# Patient Record
Sex: FEMALE | NOT HISPANIC OR LATINO
[De-identification: names, ages, dates, MRNs, and addresses within clinical notes are randomized per-mention and may not be internally consistent; named-entity substitution may affect disease eponyms.]

---
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
## 2021-10-11 ENCOUNTER — WALK IN (OUTPATIENT)
Dept: URGENT CARE | Age: 21
End: 2021-10-11

## 2021-10-11 ENCOUNTER — LAB SERVICES (OUTPATIENT)
Dept: URGENT CARE | Age: 21
End: 2021-10-11

## 2021-10-11 ENCOUNTER — IMAGING SERVICES (OUTPATIENT)
Dept: GENERAL RADIOLOGY | Age: 21
End: 2021-10-11
Attending: FAMILY MEDICINE

## 2021-10-11 VITALS
TEMPERATURE: 98 F | DIASTOLIC BLOOD PRESSURE: 76 MMHG | RESPIRATION RATE: 16 BRPM | OXYGEN SATURATION: 97 % | HEART RATE: 64 BPM | SYSTOLIC BLOOD PRESSURE: 120 MMHG

## 2021-10-11 DIAGNOSIS — S99.921A INJURY OF RIGHT FOOT, INITIAL ENCOUNTER: Primary | ICD-10-CM

## 2021-10-11 DIAGNOSIS — S99.921A INJURY OF RIGHT FOOT, INITIAL ENCOUNTER: ICD-10-CM

## 2021-10-11 PROCEDURE — 99204 OFFICE O/P NEW MOD 45 MIN: CPT | Performed by: FAMILY MEDICINE

## 2021-10-11 PROCEDURE — 29515 APPLICATION SHORT LEG SPLINT: CPT

## 2021-10-11 PROCEDURE — 73630 X-RAY EXAM OF FOOT: CPT | Performed by: RADIOLOGY

## 2021-10-11 PROCEDURE — A6449 LT COMPRES BAND >=3" <5"/YD: HCPCS

## 2021-10-11 ASSESSMENT — PAIN SCALES - GENERAL: PAINLEVEL: 0

## 2021-10-12 ENCOUNTER — TELEPHONE (OUTPATIENT)
Dept: SPORTS MEDICINE | Age: 21
End: 2021-10-12

## 2021-10-14 ENCOUNTER — OFFICE VISIT (OUTPATIENT)
Dept: ORTHOPEDICS | Age: 21
End: 2021-10-14

## 2021-10-14 VITALS — BODY MASS INDEX: 22.5 KG/M2 | WEIGHT: 140 LBS | HEIGHT: 66 IN

## 2021-10-14 DIAGNOSIS — M79.671 PAIN IN RIGHT FOOT: Primary | ICD-10-CM

## 2021-10-14 PROCEDURE — L3260 AMBULATORY SURGICAL BOOT EAC: HCPCS | Performed by: ORTHOPAEDIC SURGERY

## 2021-10-14 PROCEDURE — 99204 OFFICE O/P NEW MOD 45 MIN: CPT | Performed by: ORTHOPAEDIC SURGERY

## 2021-10-31 ENCOUNTER — WALK IN (OUTPATIENT)
Dept: URGENT CARE | Age: 21
End: 2021-10-31

## 2021-10-31 VITALS
OXYGEN SATURATION: 98 % | RESPIRATION RATE: 20 BRPM | SYSTOLIC BLOOD PRESSURE: 110 MMHG | DIASTOLIC BLOOD PRESSURE: 70 MMHG | HEART RATE: 72 BPM | TEMPERATURE: 98 F

## 2021-10-31 DIAGNOSIS — R09.81 SINUS CONGESTION: ICD-10-CM

## 2021-10-31 DIAGNOSIS — J02.0 STREP THROAT: ICD-10-CM

## 2021-10-31 DIAGNOSIS — J02.9 SORE THROAT: Primary | ICD-10-CM

## 2021-10-31 LAB
INTERNAL PROCEDURAL CONTROLS ACCEPTABLE: YES
S PYO AG THROAT QL IA.RAPID: POSITIVE
SARS-COV+SARS-COV-2 AG RESP QL IA.RAPID: NOT DETECTED

## 2021-10-31 PROCEDURE — 99214 OFFICE O/P EST MOD 30 MIN: CPT | Performed by: INTERNAL MEDICINE

## 2021-10-31 PROCEDURE — 87426 SARSCOV CORONAVIRUS AG IA: CPT | Performed by: INTERNAL MEDICINE

## 2021-10-31 PROCEDURE — 87880 STREP A ASSAY W/OPTIC: CPT | Performed by: INTERNAL MEDICINE

## 2021-10-31 RX ORDER — AMOXICILLIN 875 MG/1
875 TABLET, COATED ORAL 2 TIMES DAILY
Qty: 20 TABLET | Refills: 0 | Status: SHIPPED | OUTPATIENT
Start: 2021-10-31 | End: 2021-11-10

## 2021-10-31 ASSESSMENT — PAIN SCALES - GENERAL: PAINLEVEL: 0

## 2022-01-06 ENCOUNTER — WALK IN (OUTPATIENT)
Dept: URGENT CARE | Age: 22
End: 2022-01-06

## 2022-01-06 VITALS
RESPIRATION RATE: 16 BRPM | DIASTOLIC BLOOD PRESSURE: 80 MMHG | HEART RATE: 64 BPM | SYSTOLIC BLOOD PRESSURE: 120 MMHG | OXYGEN SATURATION: 100 % | TEMPERATURE: 98.9 F

## 2022-01-06 DIAGNOSIS — R09.81 NASAL CONGESTION: ICD-10-CM

## 2022-01-06 DIAGNOSIS — U07.1 COVID-19 VIRUS INFECTION: Primary | ICD-10-CM

## 2022-01-06 LAB — SARS-COV+SARS-COV-2 AG RESP QL IA.RAPID: DETECTED

## 2022-01-06 PROCEDURE — 99214 OFFICE O/P EST MOD 30 MIN: CPT | Performed by: FAMILY MEDICINE

## 2022-01-06 PROCEDURE — 87426 SARSCOV CORONAVIRUS AG IA: CPT | Performed by: FAMILY MEDICINE

## 2022-03-16 ENCOUNTER — TELEPHONE (OUTPATIENT)
Dept: OBGYN | Age: 22
End: 2022-03-16

## 2022-03-17 ENCOUNTER — OFFICE VISIT (OUTPATIENT)
Dept: OBGYN | Age: 22
End: 2022-03-17

## 2022-03-17 VITALS
TEMPERATURE: 97.3 F | RESPIRATION RATE: 16 BRPM | BODY MASS INDEX: 20.57 KG/M2 | HEIGHT: 66 IN | WEIGHT: 128 LBS | SYSTOLIC BLOOD PRESSURE: 110 MMHG | DIASTOLIC BLOOD PRESSURE: 70 MMHG

## 2022-03-17 DIAGNOSIS — Z11.3 SCREEN FOR STD (SEXUALLY TRANSMITTED DISEASE): ICD-10-CM

## 2022-03-17 DIAGNOSIS — N94.10 DYSPAREUNIA, FEMALE: Primary | ICD-10-CM

## 2022-03-17 DIAGNOSIS — Z01.818 PREPROCEDURAL EXAMINATION: ICD-10-CM

## 2022-03-17 DIAGNOSIS — Z30.431 INTRAUTERINE DEVICE SURVEILLANCE: ICD-10-CM

## 2022-03-17 LAB
B-HCG UR QL: NEGATIVE
INTERNAL PROCEDURAL CONTROLS ACCEPTABLE: YES

## 2022-03-17 PROCEDURE — 87491 CHLMYD TRACH DNA AMP PROBE: CPT | Performed by: PHYSICIAN ASSISTANT

## 2022-03-17 PROCEDURE — 99213 OFFICE O/P EST LOW 20 MIN: CPT | Performed by: PHYSICIAN ASSISTANT

## 2022-03-17 PROCEDURE — 87591 N.GONORRHOEAE DNA AMP PROB: CPT | Performed by: PHYSICIAN ASSISTANT

## 2022-03-17 PROCEDURE — 87661 TRICHOMONAS VAGINALIS AMPLIF: CPT | Performed by: PHYSICIAN ASSISTANT

## 2022-03-17 PROCEDURE — 81025 URINE PREGNANCY TEST: CPT | Performed by: PHYSICIAN ASSISTANT

## 2022-03-18 LAB
C TRACH DNA GENITAL QL NAA+PROBE: NEGATIVE
N GONORRHOEA DNA GENITAL QL NAA+PROBE: NEGATIVE
T VAGINALIS DNA GENITAL QL NAA+PROBE: NEGATIVE

## 2022-04-05 ENCOUNTER — IMAGING SERVICES (OUTPATIENT)
Dept: ULTRASOUND IMAGING | Age: 22
End: 2022-04-05
Attending: PHYSICIAN ASSISTANT

## 2022-04-05 DIAGNOSIS — N94.10 DYSPAREUNIA, FEMALE: ICD-10-CM

## 2022-04-05 PROCEDURE — 76856 US EXAM PELVIC COMPLETE: CPT | Performed by: RADIOLOGY

## 2022-04-05 PROCEDURE — 76830 TRANSVAGINAL US NON-OB: CPT | Performed by: RADIOLOGY

## 2022-06-13 ENCOUNTER — WALK IN (OUTPATIENT)
Dept: URGENT CARE | Age: 22
End: 2022-06-13

## 2022-06-13 VITALS
HEART RATE: 69 BPM | TEMPERATURE: 98.5 F | SYSTOLIC BLOOD PRESSURE: 118 MMHG | OXYGEN SATURATION: 98 % | DIASTOLIC BLOOD PRESSURE: 74 MMHG | RESPIRATION RATE: 20 BRPM

## 2022-06-13 DIAGNOSIS — I88.9 LYMPHADENITIS: Primary | ICD-10-CM

## 2022-06-13 DIAGNOSIS — Z76.89 ENCOUNTER TO ESTABLISH CARE: ICD-10-CM

## 2022-06-13 DIAGNOSIS — J02.9 SORE THROAT: ICD-10-CM

## 2022-06-13 LAB
INTERNAL PROCEDURAL CONTROLS ACCEPTABLE: YES
S PYO AG THROAT QL IA.RAPID: NEGATIVE
TEST LOT EXPIRATION DATE: NORMAL
TEST LOT NUMBER: NORMAL

## 2022-06-13 PROCEDURE — 87880 STREP A ASSAY W/OPTIC: CPT | Performed by: FAMILY MEDICINE

## 2022-06-13 PROCEDURE — 99213 OFFICE O/P EST LOW 20 MIN: CPT | Performed by: FAMILY MEDICINE

## 2022-06-13 RX ORDER — PREDNISONE 20 MG/1
40 TABLET ORAL DAILY
Qty: 10 TABLET | Refills: 0 | Status: SHIPPED | OUTPATIENT
Start: 2022-06-13 | End: 2022-06-18

## 2022-06-13 RX ORDER — AZITHROMYCIN 250 MG/1
TABLET, FILM COATED ORAL
Qty: 6 TABLET | Refills: 0 | Status: SHIPPED | OUTPATIENT
Start: 2022-06-13 | End: 2022-06-18

## 2022-07-06 ENCOUNTER — WALK IN (OUTPATIENT)
Dept: URGENT CARE | Age: 22
End: 2022-07-06

## 2022-07-06 VITALS
HEART RATE: 66 BPM | DIASTOLIC BLOOD PRESSURE: 70 MMHG | RESPIRATION RATE: 18 BRPM | TEMPERATURE: 98.3 F | SYSTOLIC BLOOD PRESSURE: 110 MMHG | OXYGEN SATURATION: 100 %

## 2022-07-06 DIAGNOSIS — R21 RASH: Primary | ICD-10-CM

## 2022-07-06 PROCEDURE — 99214 OFFICE O/P EST MOD 30 MIN: CPT | Performed by: FAMILY MEDICINE

## 2022-07-06 RX ORDER — PREDNISONE 50 MG/1
50 TABLET ORAL DAILY
Qty: 5 TABLET | Refills: 0 | Status: SHIPPED | OUTPATIENT
Start: 2022-07-06 | End: 2022-07-11

## 2022-07-06 ASSESSMENT — PAIN SCALES - GENERAL: PAINLEVEL: 0

## 2022-08-16 ENCOUNTER — WALK IN (OUTPATIENT)
Dept: URGENT CARE | Age: 22
End: 2022-08-16

## 2022-08-16 VITALS
SYSTOLIC BLOOD PRESSURE: 108 MMHG | TEMPERATURE: 99.2 F | OXYGEN SATURATION: 99 % | RESPIRATION RATE: 16 BRPM | DIASTOLIC BLOOD PRESSURE: 60 MMHG | HEART RATE: 80 BPM

## 2022-08-16 DIAGNOSIS — U07.1 COVID-19 VIRUS INFECTION: Primary | ICD-10-CM

## 2022-08-16 DIAGNOSIS — R09.81 HEAD CONGESTION: ICD-10-CM

## 2022-08-16 LAB
INTERNAL PROCEDURAL CONTROLS ACCEPTABLE: YES
SARS-COV+SARS-COV-2 AG RESP QL IA.RAPID: DETECTED
TEST LOT EXPIRATION DATE: ABNORMAL
TEST LOT NUMBER: ABNORMAL

## 2022-08-16 PROCEDURE — 99214 OFFICE O/P EST MOD 30 MIN: CPT | Performed by: FAMILY MEDICINE

## 2022-08-16 PROCEDURE — 87426 SARSCOV CORONAVIRUS AG IA: CPT | Performed by: FAMILY MEDICINE

## 2022-08-16 ASSESSMENT — PAIN SCALES - GENERAL: PAINLEVEL: 0

## 2023-01-04 ENCOUNTER — WALK IN (OUTPATIENT)
Dept: URGENT CARE | Age: 23
End: 2023-01-04

## 2023-01-04 VITALS
SYSTOLIC BLOOD PRESSURE: 108 MMHG | TEMPERATURE: 97.1 F | OXYGEN SATURATION: 100 % | HEART RATE: 75 BPM | RESPIRATION RATE: 20 BRPM | DIASTOLIC BLOOD PRESSURE: 60 MMHG

## 2023-01-04 DIAGNOSIS — R30.0 DYSURIA: Primary | ICD-10-CM

## 2023-01-04 LAB
APPEARANCE, POC: ABNORMAL
BILIRUBIN, POC: NEGATIVE
COLOR, POC: YELLOW
GLUCOSE UR-MCNC: NEGATIVE MG/DL
KETONES, POC: NEGATIVE MG/DL
NITRITE, POC: POSITIVE
OCCULT BLOOD, POC: ABNORMAL
PH UR: 6.5 [PH] (ref 5–7)
PROT UR-MCNC: ABNORMAL MG/DL
SP GR UR: 1.02 (ref 1–1.03)
UROBILINOGEN UR-MCNC: 0.2 MG/DL (ref 0–1)
WBC (LEUKOCYTE) ESTERASE, POC: ABNORMAL

## 2023-01-04 PROCEDURE — 81002 URINALYSIS NONAUTO W/O SCOPE: CPT | Performed by: FAMILY MEDICINE

## 2023-01-04 PROCEDURE — 87088 URINE BACTERIA CULTURE: CPT | Performed by: INTERNAL MEDICINE

## 2023-01-04 PROCEDURE — 99214 OFFICE O/P EST MOD 30 MIN: CPT | Performed by: FAMILY MEDICINE

## 2023-01-04 PROCEDURE — 87086 URINE CULTURE/COLONY COUNT: CPT | Performed by: INTERNAL MEDICINE

## 2023-01-04 PROCEDURE — 87186 SC STD MICRODIL/AGAR DIL: CPT | Performed by: INTERNAL MEDICINE

## 2023-01-04 RX ORDER — NITROFURANTOIN 25; 75 MG/1; MG/1
100 CAPSULE ORAL 2 TIMES DAILY
Qty: 10 CAPSULE | Refills: 0 | Status: SHIPPED | OUTPATIENT
Start: 2023-01-04 | End: 2023-01-09

## 2023-01-06 LAB — BACTERIA UR CULT: ABNORMAL

## 2023-01-07 ENCOUNTER — TELEPHONE (OUTPATIENT)
Dept: URGENT CARE | Age: 23
End: 2023-01-07

## 2023-03-31 ENCOUNTER — WALK IN (OUTPATIENT)
Dept: URGENT CARE | Age: 23
End: 2023-03-31

## 2023-03-31 VITALS
OXYGEN SATURATION: 100 % | HEART RATE: 64 BPM | SYSTOLIC BLOOD PRESSURE: 114 MMHG | TEMPERATURE: 98.7 F | DIASTOLIC BLOOD PRESSURE: 65 MMHG | RESPIRATION RATE: 18 BRPM

## 2023-03-31 DIAGNOSIS — J02.9 SORE THROAT: ICD-10-CM

## 2023-03-31 DIAGNOSIS — J02.0 STREP PHARYNGITIS: Primary | ICD-10-CM

## 2023-03-31 LAB
INTERNAL PROCEDURAL CONTROLS ACCEPTABLE: YES
S PYO AG THROAT QL IA.RAPID: POSITIVE
TEST LOT EXPIRATION DATE: ABNORMAL
TEST LOT NUMBER: ABNORMAL

## 2023-03-31 PROCEDURE — 99214 OFFICE O/P EST MOD 30 MIN: CPT | Performed by: FAMILY MEDICINE

## 2023-03-31 PROCEDURE — 87880 STREP A ASSAY W/OPTIC: CPT | Performed by: FAMILY MEDICINE

## 2023-03-31 RX ORDER — AMOXICILLIN 875 MG/1
875 TABLET, COATED ORAL 2 TIMES DAILY
Qty: 14 TABLET | Refills: 0 | Status: SHIPPED | OUTPATIENT
Start: 2023-03-31 | End: 2023-04-07

## 2023-03-31 ASSESSMENT — ENCOUNTER SYMPTOMS
WHEEZING: 0
DIARRHEA: 0
SINUS PAIN: 0
SWOLLEN GLANDS: 1
TROUBLE SWALLOWING: 1
RHINORRHEA: 0
VOMITING: 0
NAUSEA: 0
SORE THROAT: 1
SHORTNESS OF BREATH: 0
FATIGUE: 0
CONSTIPATION: 0
CHILLS: 1
HEADACHES: 0
COUGH: 0
FEVER: 0
SINUS PRESSURE: 0
ABDOMINAL PAIN: 0

## 2025-01-03 ENCOUNTER — OFFICE VISIT (OUTPATIENT)
Age: 25
End: 2025-01-03
Attending: PEDIATRICS
Payer: COMMERCIAL

## 2025-01-03 ENCOUNTER — NURSE ONLY (OUTPATIENT)
Age: 25
End: 2025-01-03
Attending: PEDIATRICS
Payer: COMMERCIAL

## 2025-01-03 DIAGNOSIS — Z80.0 FAMILY HISTORY OF COLON CANCER: ICD-10-CM

## 2025-01-03 DIAGNOSIS — Z84.81 FAMILY HISTORY OF GENETIC DISEASE CARRIER: Primary | ICD-10-CM

## 2025-01-03 DIAGNOSIS — Z80.9 FAMILY HISTORY OF CANCER: ICD-10-CM

## 2025-01-03 NOTE — PROGRESS NOTES
Patient Name: Patsy Mcbride  YOB: 2000  Date of Visit: 1/3/2025    Reason for visit: Ms. Mcbride was seen for the purposes of genetic counseling due to a family history of cancer with a MSH2 c.2090G>A ([id number]) pathogenic variant causative of Hunt syndrome previously identified in her father. She was accompanied to the visit by her brother and mother    Referring Provider: Self/family    Medical History: Ms. Mcbride is a pleasant and generally healthy 24 year old female presenting with no cancer history. She has had a colonoscopy last ~2 years ago which was normal.     Ms. Mcbride achieved menarche at approximately 14 years of age, is pre-menopausal, and is . She retains her uterus, ovaries, and fallopian tubes. Ms. Mcbride has a ~4-year history of oral contraceptive use and denies any fertility or hormone replacement use.      Relevant Family History: Ms. Mcbride has 1 full brother (20y) and 1 maternal half sister (35y). The 35-year old maternal half-sister was recently dx in December with thyroid cancer, papillary type, she will be getting surgery.    Ms. Mcbride' father (55y) was dx with colon cancer at ~31-32y s/p surgery, no MMR-IHC/MSI testing was available for review if done. He has be getting c-scopes every year, there have been some benign polyps removed. He was dx with a sebaceous carcinoma of the right hip area at 50y s/p resection (no MMR IHC testing was done). He also has a h/o x2 SCC on his right ear and left nose. He sees dermatology every 6 months. 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     Paternal relatives: Cancer Site, Age at Diagnosis, Other Relevant History   Paternal aunt: BCC Unknown   Paternal uncle: Melanoma 49 Colon polyps      PGMA:  at 68y dt sepsis following renal ex-lap/bx, she  was an only child   Uterine ca  Colon/Small Intestine;  Skin (Sebaceous tumor, SCC, BCC)  Kidney tumor/cancer  Unknown ages of diagnoses for skin cancer;     Paternal great-grandfather: Prostate 80's  at 98   Paternal great-grandmother: Metastatic, unknown primary 60's  at 65   Paternal grandmother's maternal aunt #1 Cervical/uterine;   Bladder;  Pancreatic Unknown  in her 80's   Paternal grandmother's maternal aunt #2 Melanoma;  Large Intestine;  Uterine 70's;  Unknown;  Unknown   Paternal grandmother's maternal uncle #1 Colon Unknown  in his 50's   Paternal grandmother's maternal uncle #2 Colon Unknown       PGFA's brother: Leukemia 10, living.      Ms. Vera mother is a pleasant 55 year old female presenting with CLL dx ~2022. Ms. Vera mother was adopted and has limited information about other biological relatives. A known maternal uncle also reportedly has CLL.      The rest of the family history is negative for other significant genetic conditions, cancers, or birth defects of any kind. See scanned pedigree for full family history reported during the session.    Ms. Veras maternal and paternal ethnicity is /mixed-.     Summary: Because Ms. Mcbride' father was found to have a pathogenic MSH2 variant, Ms. Mcbride has an estimated 50% chance to have inherited the same MSH2 variant as her father, genetic testing is indicated.     The following information was shared with Ms. Mcbride. A printed informational handout was given.     Information for individuals with a single MSH2 pathogenic variant  Individuals with a single MSH2 pathogenic variant have a genetic condition called Hunt syndrome (LS) (aka: hereditary non-polyposis colorectal cancer/HNPCC). Individuals with Hunt syndrome have an increased risk of developing certain cancers in their life including colorectal cancer as well as cancers of the endometrium, ovary, stomach, small  intestine, urinary tract, pancreas and brain. Hunt syndrome is caused by pathogenic variants in one of the mismatch repair genes--MLH1, MSH2, MSH6, PMS2--as well as deletions in the 3' end of the EPCAM gene. It is not possible to predict when or whether a person with a MSH2 pathogenic variant will develop cancer or what type of cancer they may develop, which is why continued and close follow-up with medical providers is important.     Cancer risks associated with MSH2-related Hunt Syndrome, general population risks in parentheses (NCCN, V.3.2024):    Colorectal, 33-52%  (4.1%)  Endometrial, 21-57%  (3.1%)  Ovarian, 8-38%  (1.1%)  Renal pelvis and/or ureter, 2.2-28%  (-)  Bladder, 4.4-12.8%  (2.3%)  Gastric, 0.2-9.0%  (0.8%)  Small bowel, 1.1-10%  (0.3%)  Pancreas, 0.5-1.6%  (1.7%)  Biliary tract, 0.02-1.7%  (0.2%)  Prostate, 3.9-23.8%  (12.6%)  Brain, 2.5-7.7%  (0.5%)  Skin (benign and malignant findings), increased  (-)    Management  Surveillance guidelines for individuals with MSH2-related Hunt syndrome include (NCCN, V.3.2024):    Colon cancer  - High-quality colonoscopy every 1-2 years starting at 20-25 years of age, or 2-5 years prior to the earliest colon cancer if diagnosed before age 25.  - Consider the use of daily aspirin (600 mg/day for at least 2 years was studied, the optimal dose is still under study) as data has shown it decreases the risk of colon cancer in Hunt syndrome. The decision to use aspirin for risk reduction and dosage should be made on an individual basis, including discussion of individual risks, benefits, adverse effects, and childbearing plans.    Gynecological cancers (if applicable)  - Given the higher risks of early endometrial (uterine) cancer and ovarian cancer in MSH2, hysterectomy with bilateral salpingo-oophorectomy (BSO)(surgical removal of the uterus, ovaries, and fallopian tubes) may be considered starting at age 40 y.   - As premature menopause due to oophorectomy can  cause detriments to bone health, cardiovascular health, and generalized quality of life, estrogen replacement therapy should be considered.  -  For patients requiring a colorectal surgery such as for colorectal cancer resection, coordination with risk-reducing gynecological surgery should be considered.  - Education regarding prompt evaluation for dysfunctional uterine bleeding or other abdominal symptoms associated with endometrial and/or ovarian cancer.  - Endometrial cancer screening does not have proven benefit in patients with Hunt syndrome. However, endometrial biopsy is both highly sensitive and highly specific as a diagnostic procedure. Screening via endometrial biopsy every 1-2 y starting at age 30-35 y can be considered.  - Transvaginal ultrasound to screen for endometrial cancer in postmenopausal patients has not been shown to be sufficiently sensitive or specific as to support a positive recommendation, but may be considered at the clinician’s discretion. Transvaginal ultrasound is not recommended as a screening tool in  premenopausal patients due to the wide range of endometrial stripe thickness throughout the normal menstrual cycle.  - Data do not support routine ovarian cancer screening for Hunt syndrome. CA-125 and pelvic ultrasound are recommended for preoperative planning.  - Salpingectomy (surgical removal of the fallopian tubes) has been shown to reduce the risk of ovarian cancer in the general population and is an option for premenopausal patients with hereditary cancer risk who are not yet ready for oophorectomy.  - Consider using risk-reduction agents for endometrial and ovarian cancers, including oral contraceptive pills and progestin intrauterine systems.     Other extra-colonic cancers   - Upper GI surveillance with EGD starting at age 30-40y and repeat every 2-4 years, preferably performed in conjunction with colonoscopy. Age of initiation prior to 30 years and/or surveillance interval  less than 2 years may be considered based on family history of of upper GI cancers or high-risk endoscopic findings.  - Consider annual urinalysis beginning at 30-35 years of age to screen for urothelial cancer, particularly for individuals with a family history of urothelial cancer.   - Note: Individuals with MSH2 pathogenic variants (especially males) appear to be at higher risk of urothelial cancer.  - Consider dermatological skin exam every 1-2 years with a health care provider skilled in identifying LS-associated skin manifestations, age to begin surveillance is uncertain and can be individualized.  - Education on the signs and symptoms of neurological cancer and the important of prompt reporting of abnormal symptoms to their physicians.  - Encourage participation in routine prostate cancer screening for men beginning at age 40y and breast cancer screening for women.   - There are limited data on pancreatic cancer risk among MSH2 pathogenic variant carriers. Screening is typically recommended only for persons with a family history of 1 or more first-or second-degree relative(s) with exocrine pancreatic cancer on the same side of the family has the identified MSH2 variant. For individuals considering screening for pancreatic cancer, recommended screening includes annual endoscopic ultrasound and/or MRI/MRCP of the pancreas starting at age 50 or 10 years younger than the earliest age of pancreatic cancer diagnosis in the family.     The full version of the NCCN Guidelines is available at the following website:  www.NCCN.org.  All medical management decisions should be discussed with a physician.      Inheritance and reproductive considerations   Hunt syndrome caused by a pathogenic variant in a single copy of the MSH2 gene is inherited in an autosomal dominant pattern, meaning each first-degree relative, such as sibling or child, has a 50% chance of having inherited this pathogenic variant, and genetic testing  is recommended for adult relatives.     Individuals with MSH2-Hunt syndrome are also carriers of an autosomal recessive genetic condition called Constitutional Mismatch Repair Deficiency (CMMRD). CMMRD is a recessive condition that usually presents in childhood and is associated with a high risk of cancer. In order for an individual to be affected with an CMMRD, they must have two pathogenic variants, one in each copy of the MSH2 gene (usually one mutation is inherited from each parent). Carriers of the disorder, who have only one disease-causing genetic change, have Hunt syndrome but do not have CMMRD. If both partners in a couple have a MSH2 mutation, each biological child has a 25% chance of inheriting both mutations and having CMMRD. For this reason, anyone with an MSH2 mutation, causative of Hunt syndrome in themselves, who may be having children should consider having his or her partner tested to see if he or she also carries an MSH2 mutation.     Genetics and etiology  Several hundred mutations in the MSH2 gene that predispose carriers to colorectal, endometrial and other Hunt syndrome-associated cancers have been found. These mutations may cause the MSH2 gene to stop producing the normal protein, and to produce the wrong protein or no protein at all. When the MSH2 protein is absent or ineffective, the number of mistakes that are left unrepaired during cell division increases substantially. If the cells continue to divide, errors accumulate in DNA and the cells become unable to function properly and may form a tumor in the colon, endometrium or another part of the body.    Possible genetic testing results  If testing is performed, three results are possible: positive, negative, and variant of uncertain significance.  A positive result indicates a pathogenic variant (harmful gene mutation) has been identified, and there is an increased risk for the cancers associated with the specific gene.  Since  pathogenic variants in most cancer susceptibility genes are inherited in an autosomal dominant fashion, siblings and children of individuals with a pathogenic variant have a 50% risk of carrying the same familial pathogenic variant as well.      A negative test result would indicate that no pathogenic variant was identified in a cancer susceptibility gene.  While testing detects gene mutations, it is possible for a genetic variant to be present and go undetected.  It is also possible for a genetic variant to be located in a gene other than those being tested.     A variant of uncertain significance means that a change has been identified a cancer susceptibility gene; however, it is uncertain if the variant is pathogenic or a non-deleterious (benign) change.  With time, the variant may be reclassified as either pathogenic or benign.    Since pathogenic variant identification is necessary, an affected family member is preferred in order to confirm that a pathogenic variant is indeed present in a particular family.  If the pathogenic variant can be identified in an affected individual, this information can be used to screen other at-risk individuals in the family.  Individuals who are positive for the same pathogenic variant would be expected to have a much greater risk for developing hereditary cancer during their lifetime than the general population and surveillance and management would be rigorous.  For those individuals who are found to not carry the pathogenic variant, risks for developing cancer during their lifetime would return to those expected for individuals in the general population.  It should be emphasized that absence of a pathogenic variant in an at-risk individual would not eliminate their risk for cancer, but simply return them to the risk expected for the general population. If no affected individual is available for testing, a negative result, while reassuring, cannot be completely informative of  the familial cancer risk as it is unknown whether no pathogenic variant was found because the individual tested is truly negative for the familial pathogenic variant or whether the familial pathogenic variant was unable to be detected by the genetic testing method used. In such cases, screening and follow-up should be guided by personal and family history.     It should be noted that no one under age 18y is recommended to have testing performed for mutations in high-penetrance cancer predisposition genes for adult-onset conditions. Ms. Mcbride's genetic information is protected under the Genetic Information Nondiscrimination Act of 2008 (APRIL). APRIL is a federal law protecting individuals from genetic discrimination in health insurance and most places of employment. APRIL protections against genetic discrimination do not apply to other forms of insurance such as life, long term care, disability, and  insurance. Individuals without such policies in place may wish to consider doing so before proceeding with genetic testing, since their genetic information could potentially be used to inform decisions concerning eligibility, premiums, and coverage.    Because Ms. Mcbride' father has MHS2-associated Hunt syndrome caused by a heterozygous MSH2 c.2090G>A ([id number]) pathogenic variant, genetic testing of the MSH2 gene to rule in/out the known familial variant is indicated based on the NCCN guidelines (Hunt syndrome, V.3.2024).      Ms. Mcbride appeared to understand the information presented. On the day of the visit Ms. Mcbride elected to proceed with genetic testing for the familial MSH2 gene mutation and other hereditary cancer syndromes given maternal h/o cancer with limited maternal fhx. My office will call Ms. Mcbride as soon as results are received; post-test counseling can be scheduled at that time. Thank you for allowing me to participate in the care of your patient; please do not hesitate to  contact my office if you have any questions or concerns, [phone].    Plan:   [facility] to rule in/out the known paternal MSH2 c.2090G>A ([id number]) pathogenic variant (1 gene, MSH2; TAT: ~2 weeks) with automatic reflex testing to the multi-cancer panel (70 genes total, TAT: ~<1-2 weeks after MSH2 results).  The Genetics office will call MsJennie Reed when results are available.  Recommendations for Ms. Mcbride and family members will depend the above genetic testing results.      Send to: Sarah  Time spent with patient: 35 minutes      Angelia Ruelas MS, [facility]

## 2025-01-14 ENCOUNTER — GENETICS ENCOUNTER (OUTPATIENT)
Dept: HEMATOLOGY/ONCOLOGY | Facility: HOSPITAL | Age: 25
End: 2025-01-14

## 2025-01-14 DIAGNOSIS — Z15.09 MSH2-RELATED LYNCH SYNDROME (HNPCC1): Primary | ICD-10-CM

## 2025-01-14 NOTE — PROGRESS NOTES
Patient Name: Patsy Mcbride  YOB: 2000    Referring Provider:  Self/family   Other Providers: Svetlana Smith MD     Reason for Referral:  Ms. Mcbride had genetic testing performed on 1/3/2025 because of a family history of cancer with a MSH2 c.2090G>A ([id number]) pathogenic variant causative of Hunt syndrome previously identified in her father.      Genetic Testing Result:  Positive. 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  No other known pathogenic variants were found in a total of 70 genes on the [employer] multi-cancer panel including: AIP, ALK, APC, KATHYA, AXIN2, BAP1, BARD1, BLM, BMPR1A, BRCA1, BRCA2, BRIP1, CDC73, CDH1, CDK4, CDKN1B, CDKN2A, CHEK2, CTNNA1, DICER1, EGFR, EPCAM, FH, FLCN, GREM1, HOXB13, KIT, LZTR1, MAX, MBD4, MEN1, MET, MITF, MLH1, MSH2, MSH3, MSH6, MUTYH, NF1, NF2, NTHL1, PALB2, PDGFRA, PMS2, POLD1, POLE, POT1, WCSON2U, PTCH1, PTEN, RAD51C, RAD51D, RB1, RET, SDHA, SDHAF2, SDHB, SDHC, SDHD, SMAD4, SMARCA4, SMARCB1, SMARCE1, STK11, SUFU, AOUP022, TP53, TSC1, TSC2, VHL. Please refer to the report from [facility] for additional testing information. These results were discussed with Ms. Mcbride via telephone on 1/14/2025.    Summary and Plan:   Ms. 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 This information is important for Ms. Mcbride and relatives to be aware of. She should discuss this result and the below MSH2-specific cancer screening recommendations with her doctors.     No other pathogenic variants were identified in the additional hereditary cancer syndrome gene tested. The limitations  of the testing were discussed with Ms. Mcbride including the chance that additional pathogenic variants in a gene other than those included in this panel might be the cause of cancer in Ms. Mcbride or in relatives.    Information for individuals with a single MSH2 pathogenic variant  Individuals with a single MSH2 pathogenic variant have a genetic condition called Hunt syndrome (LS) (aka: hereditary non-polyposis colorectal cancer/HNPCC). Individuals with Hunt syndrome have an increased risk of developing certain cancers in their life including colorectal cancer as well as cancers of the endometrium, ovary, stomach, small intestine, urinary tract, pancreas and brain. Hunt syndrome is caused by pathogenic variants in one of the mismatch repair genes--MLH1, MSH2, MSH6, PMS2--as well as deletions in the 3' end of the EPCAM gene. It is not possible to predict when or whether a person with a MSH2 pathogenic variant will develop cancer or what type of cancer they may develop, which is why continued and close follow-up with medical providers is important.     Cancer risks associated with MSH2-related Hunt Syndrome, general population risks in parentheses (NCCN, V.3.2024):    Colorectal, 33-52%  (4.1%)  Endometrial, 21-57%  (3.1%)  Ovarian, 8-38%  (1.1%)  Renal pelvis and/or ureter, 2.2-28%  (-)  Bladder, 4.4-12.8%  (2.3%)  Gastric, 0.2-9.0%  (0.8%)  Small bowel, 1.1-10%  (0.3%)  Pancreas, 0.5-1.6%  (1.7%)  Biliary tract, 0.02-1.7%  (0.2%)  Prostate, 3.9-23.8%  (12.6%)  Brain, 2.5-7.7%  (0.5%)  Skin (benign and malignant findings), increased  (-)    Management  Surveillance guidelines for individuals with MSH2-related Hunt syndrome include (NCCN, V.3.2024):    Colon cancer  - High-quality colonoscopy every 1-2 years starting at 20-25 years of age, or 2-5 years prior to the earliest colon cancer if diagnosed before age 25.  - Consider the use of daily aspirin (600 mg/day for at least 2 years was studied, the optimal  dose is still under study) as data has shown it decreases the risk of colon cancer in Hunt syndrome. The decision to use aspirin for risk reduction and dosage should be made on an individual basis, including discussion of individual risks, benefits, adverse effects, and childbearing plans.    Gynecological cancers (if applicable)  - Given the higher risks of early endometrial (uterine) cancer and ovarian cancer in MSH2, hysterectomy with bilateral salpingo-oophorectomy (BSO)(surgical removal of the uterus, ovaries, and fallopian tubes) may be considered starting at age 40 y.   - As premature menopause due to oophorectomy can cause detriments to bone health, cardiovascular health, and generalized quality of life, estrogen replacement therapy should be considered.  -  For patients requiring a colorectal surgery such as for colorectal cancer resection, coordination with risk-reducing gynecological surgery should be considered.  - Education regarding prompt evaluation for dysfunctional uterine bleeding or other abdominal symptoms associated with endometrial and/or ovarian cancer.  - Endometrial cancer screening does not have proven benefit in patients with Hunt syndrome. However, endometrial biopsy is both highly sensitive and highly specific as a diagnostic procedure. Screening via endometrial biopsy every 1-2 y starting at age 30-35 y can be considered.  - Transvaginal ultrasound to screen for endometrial cancer in postmenopausal patients has not been shown to be sufficiently sensitive or specific as to support a positive recommendation, but may be considered at the clinician’s discretion. Transvaginal ultrasound is not recommended as a screening tool in  premenopausal patients due to the wide range of endometrial stripe thickness throughout the normal menstrual cycle.  - Data do not support routine ovarian cancer screening for Hunt syndrome. CA-125 and pelvic ultrasound are recommended for preoperative  planning.  - Salpingectomy (surgical removal of the fallopian tubes) has been shown to reduce the risk of ovarian cancer in the general population and is an option for premenopausal patients with hereditary cancer risk who are not yet ready for oophorectomy.  - Consider using risk-reduction agents for endometrial and ovarian cancers, including oral contraceptive pills and progestin intrauterine systems.     Other extra-colonic cancers   - Upper GI surveillance with EGD starting at age 30-40y and repeat every 2-4 years, preferably performed in conjunction with colonoscopy. Age of initiation prior to 30 years and/or surveillance interval less than 2 years may be considered based on family history of of upper GI cancers or high-risk endoscopic findings.  - Consider annual urinalysis beginning at 30-35 years of age to screen for urothelial cancer, particularly for individuals with a family history of urothelial cancer.   - Note: Individuals with MSH2 pathogenic variants (especially males) appear to be at higher risk of urothelial cancer.  - Consider dermatological skin exam every 1-2 years with a health care provider skilled in identifying LS-associated skin manifestations, age to begin surveillance is uncertain and can be individualized.  - Education on the signs and symptoms of neurological cancer and the important of prompt reporting of abnormal symptoms to their physicians.  - Encourage participation in routine prostate cancer screening for men beginning at age 40y and breast cancer screening for women.   - There are limited data on pancreatic cancer risk among MSH2 pathogenic variant carriers. Screening is typically recommended only for persons with a family history of 1 or more first-or second-degree relative(s) with exocrine pancreatic cancer on the same side of the family has the identified MSH2 variant. For individuals considering screening for pancreatic cancer, recommended screening includes annual endoscopic  ultrasound and/or MRI/MRCP of the pancreas starting at age 50 or 10 years younger than the earliest age of pancreatic cancer diagnosis in the family.     The full version of the NCCN Guidelines is available at the following website:  www.NCCN.org.  All medical management decisions should be discussed with a physician.      Inheritance and reproductive considerations   Hunt syndrome caused by a pathogenic variant in a single copy of the MSH2 gene is inherited in an autosomal dominant pattern, meaning each first-degree relative, such as sibling or child, has a 50% chance of having inherited this pathogenic variant, and genetic testing is recommended for adult relatives.     Individuals with MSH2-Hunt syndrome are also carriers of an autosomal recessive genetic condition called Constitutional Mismatch Repair Deficiency (CMMRD). CMMRD is a recessive condition that usually presents in childhood and is associated with a high risk of cancer. In order for an individual to be affected with an CMMRD, they must have two pathogenic variants, one in each copy of the MSH2 gene (usually one mutation is inherited from each parent). Carriers of the disorder, who have only one disease-causing genetic change, have Hunt syndrome but do not have CMMRD. If both partners in a couple have a MSH2 mutation, each biological child has a 25% chance of inheriting both mutations and having CMMRD. For this reason, anyone with an MSH2 mutation, causative of Hunt syndrome in themselves, who may be having children should consider having his or her partner tested to see if he or she also carries an MSH2 mutation.     Genetics and etiology  Several hundred mutations in the MSH2 gene that predispose carriers to colorectal, endometrial and other Hunt syndrome-associated cancers have been found. These mutations may cause the MSH2 gene to stop producing the normal protein, and to produce the wrong protein or no protein at all. When the MSH2 protein is  absent or ineffective, the number of mistakes that are left unrepaired during cell division increases substantially. If the cells continue to divide, errors accumulate in DNA and the cells become unable to function properly and may form a tumor in the colon, endometrium or another part of the body.    Support/information websites  [employer]: https://www.ME[employer] PRESTIGE.org/   [employer]: www.aliveBelly Ballotckfernando.org  Hunt Syndrome [employer]: www.lynchcancers.com  American Cancer Society: www.cancer.org    Implications for family members  It is advantageous to know if a MSH2 pathogenic variant is present as medical management recommendations can be implemented. At-risk relatives can be identified, allowing pursuit of a diagnostic evaluation. In addition, the available information regarding hereditary cancer susceptibility genes is constantly evolving and more clinically relevant MSH2 data is likely to become available in the near future. Awareness of this cancer predisposition allows patients to be vigilant in maintaining close and regular contact with their local healthcare providers in anticipation of new information, inform at-risk family members, and diligently follow condition-specific screening protocols.     I encouraged Ms. Mcbride to share the genetic test results with any other at risk biological family members on her paternal side of the family so that they may have their cancer risks assessed by a physician and/or genetic counselor. We discussed that should Ms. Mcbride have any biological children, they would have a 50% chance of carrying the same MSH2 pathogenic variant as Ms. Mcbride. For MSH2 gene mutation carriers of reproductive age, pre-implantation genetic diagnosis (PGD) may be an option to reduce the chance of passing a pathogenic variant to children. PGD is a procedure that combine in vitro fertilization (IVF) with molecular genetic testing. IVF is used to create embryos, then PGD is performed  by testing one cell from each embryo for the pathogenic variant carried by the parent. Only embryos that are negative for the pathogenic variant would be implanted back in the mother. This procedure significantly reduces the chance of a pathogenic variant-positive parent passing on a pathogenic variant to his or her children.    Ms. Mcbride is advised to discuss the genetic testing result and recommendations for increased surveillance for MSH2-associated cancers with her doctors. I shared information with Ms. Mcbride about Dr. Yola Mcelroy (tel. [phone]) who is a medical oncologist with [facility] who specializes in the care of patients with hereditary cancer syndromes. Ms. Mcbride is also encouraged to contact me on an annual basis to learn if there have been any updates in genetic/screening information that would apply or if the personal and/or family history changes. Please do not hesitate to contact my office if you have any questions or concerns, [phone].     Angelia Ruelas MS, CGC